# Patient Record
Sex: MALE | Race: WHITE | NOT HISPANIC OR LATINO
[De-identification: names, ages, dates, MRNs, and addresses within clinical notes are randomized per-mention and may not be internally consistent; named-entity substitution may affect disease eponyms.]

---

## 2020-05-12 PROBLEM — Z00.00 ENCOUNTER FOR PREVENTIVE HEALTH EXAMINATION: Status: ACTIVE | Noted: 2020-05-12

## 2020-05-14 ENCOUNTER — APPOINTMENT (OUTPATIENT)
Dept: RHEUMATOLOGY | Facility: CLINIC | Age: 45
End: 2020-05-14
Payer: COMMERCIAL

## 2020-05-14 DIAGNOSIS — E78.5 HYPERLIPIDEMIA, UNSPECIFIED: ICD-10-CM

## 2020-05-14 DIAGNOSIS — I10 ESSENTIAL (PRIMARY) HYPERTENSION: ICD-10-CM

## 2020-05-14 DIAGNOSIS — M54.5 LOW BACK PAIN: ICD-10-CM

## 2020-05-14 PROCEDURE — 99214 OFFICE O/P EST MOD 30 MIN: CPT | Mod: 95

## 2020-05-14 RX ORDER — AMLODIPINE BESYLATE 5 MG/1
5 TABLET ORAL
Refills: 0 | Status: ACTIVE | COMMUNITY

## 2020-05-14 NOTE — ASSESSMENT
[FreeTextEntry1] : 45 year old man with history of psoriatic arthritis.  Patient is doing well on cosentyx 300 mg every four weeks with good response.  Patient without side effects of cosentyx.  No recent infections.   Reviewed hand washing, avoiding sick contacts, social distancing, staying at home, and facial coverings when outside.  Discussed holding cosentyx if feeling unwell.  Patient will have follow up with PMD next month and will forward results to office as well.  \par

## 2020-05-14 NOTE — HISTORY OF PRESENT ILLNESS
[Home] : at home, [unfilled] , at the time of the visit. [Medical Office: ([facility])___] : at the medical office located in  [Patient] : the patient [Self] : self [FreeTextEntry1] : Discussed with patient.  You have chosen to receive care through the use of tele-media.  Telemedia enables health care providers at different locations to provide safe, effective, and convenient care through the use of technology.  Please note this is a billable encounter.  Patient understands that I cannot perform a physical examine and that patient may need to come to the clinic to complete the assessment.  Patient agreed verbally to to understanding the risks of benefits of telemedia as explained.\par \par Patient with longstanding history of Psoriatic arthritis\par currently taking cosentyx 300 mg every 4 weeks\par Previously treated with humira, enbrel, and otezla\par Also with longstanding low back pain, followed by pain management, currently tapering of fentanyl patch, continue percocet for pain.  no recent epidural injections.\par At this time, exercising, walking, biking.\par Recently bought a home on the [place], feels will be more active there as well.\par \par Cosentyx for the past six months.  no side effects noted.  Feels helping.  \par No recent infections,\par Same insurance, specialty [employer] [facility], now [facility]\par Last blood tests a few months ago, will have a follow up in the next month or  so\par No pain at present, back pain intermittently\par Knees a little more bothersome again\par +weight loss but has gained a little bit back since stay at home.  \par No rashes, no nail changes.\par

## 2020-05-14 NOTE — PHYSICAL EXAM
[General Appearance - Alert] : alert [General Appearance - In No Acute Distress] : in no acute distress [General Appearance - Well Nourished] : well nourished [General Appearance - Well Developed] : well developed [General Appearance - Well-Appearing] : healthy appearing [] : no respiratory distress [Respiration, Rhythm And Depth] : normal respiratory rhythm and effort [Exaggerated Use Of Accessory Muscles For Inspiration] : no accessory muscle use [Oriented To Time, Place, And Person] : oriented to person, place, and time [Impaired Insight] : insight and judgment were intact [Affect] : the affect was normal [Mood] : the mood was normal [FreeTextEntry1] : Limited, seen via video visit

## 2020-05-14 NOTE — DATA REVIEWED
[No studies available for review at this time.] : No studies available for review at this time. [FreeTextEntry1] : Will forward blood tests results to office.

## 2020-05-14 NOTE — HISTORY OF PRESENT ILLNESS
[Home] : at home, [unfilled] , at the time of the visit. [Medical Office: ([facility])___] : at the medical office located in  [Patient] : the patient [Self] : self [FreeTextEntry1] : Discussed with patient.  You have chosen to receive care through the use of tele-media.  Telemedia enables health care providers at different locations to provide safe, effective, and convenient care through the use of technology.  Please note this is a billable encounter.  Patient understands that I cannot perform a physical examine and that patient may need to come to the clinic to complete the assessment.  Patient agreed verbally to to understanding the risks of benefits of telemedia as explained.\par \par Patient with longstanding history of Psoriatic arthritis\par currently taking cosentyx 300 mg every 4 weeks\par Previously treated with humira, enbrel, and otezla\par Also with longstanding low back pain, followed by pain management, currently tapering of fentanyl patch, continue percocet for pain.  no recent epidural injections.\par At this time, exercising, walking, biking.\par Recently bought a home on the [place], feels will be more active there as well.\par \par Cosentyx for the past six months.  no side effects noted.  Feels helping.  \par No recent infections,\par Same insurance, specialty [facility], now [facility]\par Last blood tests a few months ago, will have a follow up in the next month or  so\par No pain at present, back pain intermittently\par Knees a little more bothersome again\par +weight loss but has gained a little bit back since stay at home.  \par No rashes, no nail changes.\par

## 2020-05-14 NOTE — REVIEW OF SYSTEMS
[Arthralgias] : arthralgias [Negative] : Heme/Lymph [FreeTextEntry9] : intermittent knee pain and back pain

## 2020-10-01 ENCOUNTER — RX RENEWAL (OUTPATIENT)
Age: 45
End: 2020-10-01

## 2020-10-22 ENCOUNTER — RX RENEWAL (OUTPATIENT)
Age: 45
End: 2020-10-22

## 2020-11-11 ENCOUNTER — TRANSCRIPTION ENCOUNTER (OUTPATIENT)
Age: 45
End: 2020-11-11

## 2020-11-12 ENCOUNTER — RX RENEWAL (OUTPATIENT)
Age: 45
End: 2020-11-12

## 2020-12-03 ENCOUNTER — RX RENEWAL (OUTPATIENT)
Age: 45
End: 2020-12-03

## 2020-12-23 ENCOUNTER — RX RENEWAL (OUTPATIENT)
Age: 45
End: 2020-12-23

## 2021-01-14 ENCOUNTER — RX RENEWAL (OUTPATIENT)
Age: 46
End: 2021-01-14

## 2021-03-05 ENCOUNTER — APPOINTMENT (OUTPATIENT)
Dept: RHEUMATOLOGY | Facility: CLINIC | Age: 46
End: 2021-03-05
Payer: COMMERCIAL

## 2021-03-05 PROCEDURE — 99214 OFFICE O/P EST MOD 30 MIN: CPT | Mod: 95

## 2021-03-05 RX ORDER — PRAVASTATIN SODIUM 80 MG/1
TABLET ORAL
Refills: 0 | Status: DISCONTINUED | COMMUNITY
End: 2021-03-05

## 2021-03-05 RX ORDER — ROSUVASTATIN CALCIUM 5 MG/1
TABLET, FILM COATED ORAL
Refills: 0 | Status: ACTIVE | COMMUNITY

## 2021-03-05 NOTE — ASSESSMENT
[FreeTextEntry1] : 45 year old man with history of psoriatic arthritis.  Patient is doing well on cosentyx 300 mg every four weeks with good response.  Patient without side effects of cosentyx.  No recent infections. Reviewed hand washing, avoiding sick contacts, social distancing, staying at home, and facial coverings. Discussed holding cosentyx if feeling unwell.  Discussed covid vaccine, currently on list in [place]. Continues to follow with spine at [place] as well.

## 2021-03-05 NOTE — HISTORY OF PRESENT ILLNESS
[Home] : at home, [unfilled] , at the time of the visit. [Medical Office: ([facility])___] : at the medical office located in  [Verbal consent obtained from patient] : the patient, [unfilled] [FreeTextEntry1] : Discussed with patient.  You have chosen to receive care through the use of tele-media.  Telemedia enables health care providers at different locations to provide safe, effective, and convenient care through the use of technology.  Please note this is a billable encounter.  Patient understands that I cannot perform a physical examine and that patient may need to come to the clinic to complete the assessment.  Patient agreed verbally to to understanding the risks of benefits of telemedia as explained.\par \par Patient with longstanding history of Psoriatic arthritis\par currently taking cosentyx 300 mg every 4 weeks\par Previously treated with humira, enbrel, and otezla\par Also with longstanding low back pain, followed by pain management, currently tapering of fentanyl patch, continue percocet for pain.  no recent epidural injections.\par At this time, exercising, walking, biking.\par Recently bought a home on the [place], feels will be more active there as well.\par \par Cosentyx for the past six months.  no side effects noted.  Feels helping.  \par No recent infections,\par Same insurance, specialty [facility], now [facility]\par Last blood tests a few months ago, will have a follow up in the next month or  so\par No pain at present, back pain intermittently\par Knees a little more bothersome again\par +weight loss but has gained a little bit back since stay at home.  \par No rashes, no nail changes.\par \par March 5, 2021\par Patient returns for follow up\par Discussed with patient.  You have chosen to receive care through the use of tele-media.  Telemedia enables health care providers at different locations to provide safe, effective, and convenient care through the use of technology.  Please note this is a billable encounter.  Patient understands that I cannot perform a physical exam and that patient may need to come to the clinic to complete the assessment.  Patient agreed verbally to understanding the risks of benefits of telemedia as explained.\par Patient feeling well.\par Continues cosentyx with good response\par Has new baby\par Working from home\par On the list for covid vaccine in [place]\par Decreasing pain medications, off fentanyl, less percocet, no recent steroid injections\par Exercising at home, weight about 225 lbs

## 2021-03-12 ENCOUNTER — NON-APPOINTMENT (OUTPATIENT)
Age: 46
End: 2021-03-12

## 2021-03-17 ENCOUNTER — TRANSCRIPTION ENCOUNTER (OUTPATIENT)
Age: 46
End: 2021-03-17

## 2023-04-17 ENCOUNTER — NON-APPOINTMENT (OUTPATIENT)
Age: 48
End: 2023-04-17

## 2023-06-27 ENCOUNTER — APPOINTMENT (OUTPATIENT)
Dept: RHEUMATOLOGY | Facility: CLINIC | Age: 48
End: 2023-06-27
Payer: COMMERCIAL

## 2023-06-27 DIAGNOSIS — G89.29 LOW BACK PAIN, UNSPECIFIED: ICD-10-CM

## 2023-06-27 DIAGNOSIS — M54.50 LOW BACK PAIN, UNSPECIFIED: ICD-10-CM

## 2023-06-27 DIAGNOSIS — L40.50 ARTHROPATHIC PSORIASIS, UNSPECIFIED: ICD-10-CM

## 2023-06-27 PROCEDURE — 99213 OFFICE O/P EST LOW 20 MIN: CPT | Mod: 95

## 2023-06-27 RX ORDER — FENTANYL 87.5 UG/H
PATCH, EXTENDED RELEASE TRANSDERMAL
Refills: 0 | Status: DISCONTINUED | COMMUNITY
End: 2023-06-27

## 2023-06-27 RX ORDER — METFORMIN HYDROCHLORIDE 625 MG/1
TABLET ORAL
Refills: 0 | Status: ACTIVE | COMMUNITY

## 2023-06-27 RX ORDER — CLOMIPHENE CITRATE 50 MG/1
TABLET ORAL
Refills: 0 | Status: DISCONTINUED | COMMUNITY
End: 2023-06-27

## 2023-06-27 RX ORDER — OXYCODONE HYDROCHLORIDE AND ACETAMINOPHEN 10; 325 MG/1; MG/1
TABLET ORAL
Refills: 0 | Status: DISCONTINUED | COMMUNITY
End: 2023-06-27

## 2023-06-27 NOTE — HISTORY OF PRESENT ILLNESS
[FreeTextEntry1] : June 27, 2023\par Discussed with patient.  You have chosen to receive care through the use of tele-media.  Telemedia enables health care providers at different locations to provide safe, effective, and convenient care through the use of technology.  Please note this is a billable encounter.  Patient understands that I cannot perform a physical exam and that patient may need to come to the clinic to complete the assessment.  Patient agreed verbally to understanding the risks of benefits of telemedia as explained. Patient is currently located in [place] as wife recently had baby and patient is unable to travel to [place] for this visit.  Provider is located in office in [place].\par Patient with longstanding history of Psoriatic arthritis\par Currently taking cosentyx 300 mg every 4 weeks\par Previously treated with humira, enbrel, and otezla\par Also with longstanding low back pain, patient no longer following with pain management, has tapered off all opioids as well as medical marijuana.  No recent interventions for pain, no recent epidural injections.\par At this time, trying to stay active, exercising, walking, biking, although on hold at this time given the recent birth of his daughter.\par Patient treated with Cosentyx since 2021, recently started on short course of metformin.\par Some weight gain during wife's pregnancy, now working to lose weight and exercise more\par No rashes, no nail changes.\par No joint swelling.\par Current medications:\par Rosuvastatin\par Amlodipine\par Metformin\par Cosentyx\par Rarely Advil or Tylenol for pain, usually for headaches.\par \par Also takes:\par Turmeric\par Vitamin D\par Calcium \par glucosamine\par Fish oil\par Krill oil\par March 5, 2021\par Patient returns for follow up\par Discussed with patient.  You have chosen to receive care through the use of tele-media.  Telemedia enables health care providers at different locations to provide safe, effective, and convenient care through the use of technology.  Please note this is a billable encounter.  Patient understands that I cannot perform a physical exam and that patient may need to come to the clinic to complete the assessment.  Patient agreed verbally to understanding the risks of benefits of telemedia as explained.\par Patient feeling well.\par Continues cosentyx with good response\par Has new baby\par Working from home\par On the list for covid vaccine in [place]\par Decreasing pain medications, off fentanyl, less percocet, no recent steroid injections\par Exercising at home, weight about 225 lbs

## 2023-06-27 NOTE — ASSESSMENT
[FreeTextEntry1] : 48 year old man with history of psoriatic arthritis.  Patient is doing well on cosentyx 300 mg every four weeks with good response, about 80% improvement since starting.  Patient without side effects of cosentyx, although is trying to decrease medications.  Currently off all pain medicines, no longer taking opioids or medical marijuana.  Discussed trial of tapering Cosentyx, will try to decrease dose to 150 mg every 4 weeks and then reassess.  No side effects of Cosentyx noted.  Patient following closely with primary care doctor currently on metformin although likely temporary given elevated A1c and prediabetes, also treating with diet and exercise.  No recent infections. Discussed holding cosentyx if feeling unwell.  Follow-up in 6 months or sooner as needed.

## 2023-06-27 NOTE — PHYSICAL EXAM
[General Appearance - Alert] : alert [General Appearance - In No Acute Distress] : in no acute distress [General Appearance - Well-Appearing] : healthy appearing [Oriented To Time, Place, And Person] : oriented to person, place, and time [Impaired Insight] : insight and judgment were intact [Affect] : the affect was normal

## 2024-02-02 RX ORDER — SECUKINUMAB 150 MG/ML
150 INJECTION SUBCUTANEOUS
Qty: 2 | Refills: 5 | Status: ACTIVE | COMMUNITY
Start: 2020-10-01